# Patient Record
Sex: MALE | Race: WHITE
[De-identification: names, ages, dates, MRNs, and addresses within clinical notes are randomized per-mention and may not be internally consistent; named-entity substitution may affect disease eponyms.]

---
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
## 2019-06-23 ENCOUNTER — HOSPITAL ENCOUNTER (EMERGENCY)
Dept: HOSPITAL 12 - ER | Age: 19
LOS: 1 days | Discharge: HOME | End: 2019-06-24
Payer: COMMERCIAL

## 2019-06-23 VITALS — WEIGHT: 212 LBS | HEIGHT: 69 IN | BODY MASS INDEX: 31.4 KG/M2

## 2019-06-23 DIAGNOSIS — M22.42: Primary | ICD-10-CM

## 2019-06-23 DIAGNOSIS — M19.012: ICD-10-CM

## 2019-06-23 DIAGNOSIS — R07.89: ICD-10-CM

## 2019-06-23 DIAGNOSIS — M22.41: ICD-10-CM

## 2019-06-23 PROCEDURE — A4663 DIALYSIS BLOOD PRESSURE CUFF: HCPCS

## 2019-06-23 NOTE — NUR
Patient ambulated with stable gait. A/Ox4. Patient came for c/o multiple body 
pains. Pain in bilateral knees, left flank, left knee. Patient reports that he 
is a power  has not weight lifted in 2 months due to strain. Came in 
today due to increasing pain. Respiratory even and unlabored, no cough no sob. 
No cardiovascular distress noted, all pulses palpable. No GI distress noted.

## 2019-06-24 ENCOUNTER — HOSPITAL ENCOUNTER (EMERGENCY)
Dept: HOSPITAL 10 - FTE | Age: 19
Discharge: HOME | End: 2019-06-24
Payer: COMMERCIAL

## 2019-06-24 ENCOUNTER — HOSPITAL ENCOUNTER (EMERGENCY)
Dept: HOSPITAL 91 - FTE | Age: 19
Discharge: HOME | End: 2019-06-24
Payer: COMMERCIAL

## 2019-06-24 VITALS — DIASTOLIC BLOOD PRESSURE: 72 MMHG | SYSTOLIC BLOOD PRESSURE: 155 MMHG | RESPIRATION RATE: 18 BRPM | HEART RATE: 80 BPM

## 2019-06-24 VITALS — DIASTOLIC BLOOD PRESSURE: 78 MMHG | SYSTOLIC BLOOD PRESSURE: 143 MMHG

## 2019-06-24 VITALS
BODY MASS INDEX: 31.67 KG/M2 | HEIGHT: 69 IN | BODY MASS INDEX: 31.67 KG/M2 | WEIGHT: 213.85 LBS | WEIGHT: 213.85 LBS | HEIGHT: 69 IN

## 2019-06-24 DIAGNOSIS — M25.562: ICD-10-CM

## 2019-06-24 DIAGNOSIS — M25.512: Primary | ICD-10-CM

## 2019-06-24 DIAGNOSIS — M25.561: ICD-10-CM

## 2019-06-24 PROCEDURE — 96372 THER/PROPH/DIAG INJ SC/IM: CPT

## 2019-06-24 PROCEDURE — 96374 THER/PROPH/DIAG INJ IV PUSH: CPT

## 2019-06-24 PROCEDURE — 99284 EMERGENCY DEPT VISIT MOD MDM: CPT

## 2019-06-24 RX ADMIN — DEXAMETHASONE SODIUM PHOSPHATE 1 MG: 10 INJECTION, SOLUTION INTRAMUSCULAR; INTRAVENOUS at 20:33

## 2019-06-24 RX ADMIN — HYDROCODONE BITARTRATE AND ACETAMINOPHEN 1 TAB: 5; 325 TABLET ORAL at 20:33

## 2019-06-24 RX ADMIN — KETOROLAC TROMETHAMINE 1 MG: 30 INJECTION, SOLUTION INTRAMUSCULAR at 20:33

## 2019-06-24 NOTE — ERD
ER Documentation


Chief Complaint


Chief Complaint





pain shoulder/bilateral knee pain x 1 year, worse x 2 weeks





HPI


History of Present Illness: 19-year-old male who denies a past medical history 


coming in today with complaint of bilateral knee pain and left shoulder pain 


that is present for 1 year patient reports worsening of pain in the past 2 


weeks.  Patient reports visiting [facility] emergency department yesterday in which 


x-rays and CAT scans were done and he was discharged home with diclofenac.  


Patient reports that [facility] emergency department informed that he should come to


[facility] ER for a MRI.  Patient reports that his friend also works for [facility] and told him that the ER should do an MRI for him patient 


is very adamant about having a MRI done during this visit today.  Patient denies


any injury or trauma.  Denies any worsening of his condition since yesterday.





At home pharmacological/nonpharmacological treatment for symptoms: Voltaren





Denies social concerns; Denies recent foreign travel





ROS


All systems reviewed and are negative except as per history of present illness.





Medications


Home Meds


Active Scripts


Diclofenac Sodium* (Voltaren* Gel) 1% -100 Gm Gel, 10 GM TOP BID for joint pain,


#2 TUB


   Apply 4 grams to each knee joint.  Applied 2 grams to left shoulder.


   32 grams a day maximum.


   Prov:JORGE LUIS HERNANDEZ NP         6/24/19


Tramadol HCl (Tramadol HCl) 50 Mg Tablet, 50 MG PO Q8 for MODERATE-SEVERE PAIN, 


#20 TAB


   Prov:JORGE LUIS HERNANDEZ NP         6/24/19





Allergies


Allergies:  


Coded Allergies:  


     No Known Drug Allergies (Verified  Allergy, Unknown, 6/24/19)





PMhx/Soc


Medical and Surgical Hx:  pt denies Medical Hx, pt denies Surgical Hx


Hx Alcohol Use:  No


Hx Substance Use:  No


Hx Tobacco Use:  No


Smoking Status:  Never smoker





FmHx


Family History:  diabetes





Physical Exam


Vitals





Vital Signs


  Date      Temp  Pulse  Resp  B/P (MAP)   Pulse Ox  O2          O2 Flow    FiO2


Time                                                 Delivery    Rate


   6/24/19  97.6     80    18      155/72        99


     19:24                           (99)





Physical Exam


Const:   No acute distress


Head:   Atraumatic 


Eyes:    Normal Conjunctiva


ENT:    Normal External Ears, Nose and Mouth.


Neck:               Full range of motion. No meningismus.


Resp:   Clear to auscultation bilaterally


Cardio:   Regular rate and rhythm, no murmurs


Abd:    Soft, non tender, non distended. Normal bowel sounds


Skin:   No petechiae or rashes


Back:   No midline or flank tenderness


Ext:    No cyanosis, or edema; tenderness to palpation to bilateral knee, 


tenderness to palpation to left shoulder, 75% extension range of motion, 


decreased secondary to pain, no physical signs of pain or grimacing


Neur:   Awake and alert


Psych:    Normal Mood and Affect


Results 24 hrs





Current Medications


 Medications
   Dose
          Sig/Jaime
       Start Time
   Status  Last


 (Trade)       Ordered        Route
 PRN     Stop Time              Admin
Dose


                              Reason                                Admin


 Ketorolac
     30 mg          ONCE  STAT
    6/24/19       DC           6/24/19


Tromethamine
                 IV
            20:24
                       20:33



 (Toradol)                                   6/24/19 20:26


                10 mg          ONCE  ONCE
    6/24/19       DC           6/24/19


Dexamethasone                 IM
            20:30
                       20:33




  (Decadron)                                6/24/19 20:31


                1 tab          ONCE  ONCE
    6/24/19       DC           6/24/19


Acetaminophen                 PO
            20:30
                       20:33



/
                                           6/24/19 20:31


Hydrocodone


Bitart



([place]


(5/325))








Procedures/MDM


ED course includes a thorough examination and history. 


Medications: Dexamethasone, ketorolac, Norco


Imaging: I do not feel imaging was warranted


Labs: I do not feel labs are warranted





Low suspicion for life-threatening medical emergency.  Low suspicion for 


orthopedic emergency including acute fracture, dislocation.





Otherwise healthy patient presenting with constellation of symptoms likely 


representing  chronic knee and shoulder pain as characterized by history, 


physical exam findings.





Patient reassessment 2100: Patient hemodynamically stable.  No respiratory 


distress, otherwise relatively well appearing and nontoxic.  Disposition given. 


Patient educated on diagnoses, prescriptions, follow-up care, return 


precautions.  Strict return precautions given for worsening condition; questions


answered discharge.





Disposition for discharge with followup in 2 days with PCP/clinic.





Departure


Diagnosis:  


   Primary Impression:  


   Chronic left shoulder pain


   Additional Impression:  


   Chronic pain of both knees


Condition:  Stable


Patient Instructions:  Shoulder Problems, Reducing Knee Pain and Swelling


Referrals:  


COMM[facility]ITY CLINICS


YOU HAVE RECEIVED A MEDICAL SCREENING EXAM AND THE RESULTS INDICATE THAT YOU DO 


NOT HAVE A CONDITION THAT REQUIRES URGENT TREATMENT IN THE EMERGENCY DEPARTMENT.





FURTHER EVALUATION AND TREATMENT OF YOUR CONDITION CAN WAIT UNTIL YOU ARE SEEN 


IN YOUR DOCTORS OFFICE WITHIN THE NEXT 1-2 DAYS. IT IS YOUR RESPONSIBILITY TO 


MAKE AN APPOINTMENT FOR FOLOW-UP CARE.





IF YOU HAVE A PRIMARY DOCTOR


--you should call your primary doctor and schedule an appointment





IF YOU DO NOT HAVE A PRIMARY DOCTOR YOU CAN CALL OUR PHYSICIAN REFERRAL HOTLINE 


AT


 [phone] 





IF YOU CAN NOT AFFORD TO SEE A PHYSICIAN YOU CAN CHOSE FROM THE FOLLOWING 


[facility] [phone] [address] [facility]





[facility] [phone] [address] [facility] [phone] [address] [facility] [phone] [address] [facility] [phone] [address] [facility]. [facility] [phone] [address] [facility]. [facility]


YOU HAVE RECEIVED A MEDICAL SCREENING EXAM AND THE RESULTS INDICATE THAT YOU DO 


NOT HAVE A CONDITION THAT REQUIRES URGENT TREATMENT IN THE EMERGENCY DEPARTMENT.





FURTHER EVALUATION AND TREATMENT OF YOUR CONDITION CAN WAIT UNTIL YOU ARE SEEN 


IN YOUR DOCTORS OFFICE WITHIN THE NEXT 1-2 DAYS. IT IS YOUR RESPONSIBILITY TO 


MAKE AN APPOINTMENT FOR FOLOW-UP CARE.





IF YOU HAVE A PRIMARY DOCTOR


--you should call your primary doctor and schedule and appointment





IF YOU DO NOT HAVE A PRIMARY DOCTOR YOU CAN CALL OUR PHYSICIAN REFERRAL HOTLINE 


AT [phone].





IF YOU CAN NOT AFFORD TO SEE A PHYSICIAN YOU CAN CHOSE FROM THE FOLLOWING [facility]:





[facility]


[address] [place] [address]





[facility]


[address] [place] [address]





[facility]


[address] [place] [address]





Additional Instructions:  


Thank you very much for allowing us to participate in your care. 


Your health and safety is our top priority at [facility].  It 


is important to read all discharge instructions and education provided in your 


discharge packet.





*Continue diclofenac.  The steroid injection that we gave you during the ER 


visit should last for approximately 6 days.  Referrals are including your 


paperwork to primary care.  See primary care doctor for follow-up; they may may 


refer you to an orthopedic surgeon or they may order a MRI (primary care will 


make this decision.)





Call your primary care doctor TOMORROW for an appointment during the next 2-4 


days and bring all the information and medications prescribed. 





Have prescriptions filled and follow precisely the directions on the label. 


Tramadol is an opiate pain medication; take this medication as needed for 


moderate to severe pain.  No operating of heavy machinery while taking this 


medication.  It may cause drowsiness.





If the symptoms get worse and your provider is unavailable, return to the 


Emergency Department immediately.











JORGE LUIS HERNANDEZ NP            Jun 24, 2019 21:41